# Patient Record
Sex: FEMALE | Race: WHITE | NOT HISPANIC OR LATINO
[De-identification: names, ages, dates, MRNs, and addresses within clinical notes are randomized per-mention and may not be internally consistent; named-entity substitution may affect disease eponyms.]

---

## 2017-02-26 ENCOUNTER — FORM ENCOUNTER (OUTPATIENT)
Age: 62
End: 2017-02-26

## 2017-02-27 ENCOUNTER — APPOINTMENT (OUTPATIENT)
Dept: RHEUMATOLOGY | Facility: CLINIC | Age: 62
End: 2017-02-27

## 2017-02-27 VITALS
DIASTOLIC BLOOD PRESSURE: 83 MMHG | OXYGEN SATURATION: 98 % | HEART RATE: 86 BPM | SYSTOLIC BLOOD PRESSURE: 129 MMHG | HEIGHT: 64 IN | BODY MASS INDEX: 40.8 KG/M2 | WEIGHT: 239 LBS

## 2017-02-27 DIAGNOSIS — D68.9 COAGULATION DEFECT, UNSPECIFIED: ICD-10-CM

## 2017-02-27 DIAGNOSIS — E66.01 MORBID (SEVERE) OBESITY DUE TO EXCESS CALORIES: ICD-10-CM

## 2017-02-27 DIAGNOSIS — B02.29 OTHER POSTHERPETIC NERVOUS SYSTEM INVOLVEMENT: ICD-10-CM

## 2017-02-27 DIAGNOSIS — M15.9 POLYOSTEOARTHRITIS, UNSPECIFIED: ICD-10-CM

## 2017-02-27 DIAGNOSIS — I50.22 CHRONIC SYSTOLIC (CONGESTIVE) HEART FAILURE: ICD-10-CM

## 2017-02-27 RX ORDER — POTASSIUM CHLORIDE 1500 MG/1
20 TABLET, FILM COATED, EXTENDED RELEASE ORAL
Qty: 9 | Refills: 0 | Status: ACTIVE | COMMUNITY
Start: 2017-02-27

## 2017-02-27 RX ADMIN — METHYLPREDNISOLONE ACETATE 0 MG/ML: 40 INJECTION, SUSPENSION INTRA-ARTICULAR; INTRALESIONAL; INTRAMUSCULAR; SOFT TISSUE at 00:00

## 2017-03-03 LAB
25(OH)D3 SERPL-MCNC: 26.4 NG/ML
ALBUMIN SERPL ELPH-MCNC: 4.1 G/DL
ALP BLD-CCNC: 114 U/L
ALT SERPL-CCNC: 19 U/L
ANA SER IF-ACNC: NEGATIVE
ANION GAP SERPL CALC-SCNC: 14 MMOL/L
APPEARANCE: CLEAR
AST SERPL-CCNC: 19 U/L
B2 GLYCOPROT1 IGA SERPL IA-ACNC: <5 SAU
BASOPHILS # BLD AUTO: 0.05 K/UL
BASOPHILS NFR BLD AUTO: 0.7 %
BILIRUB SERPL-MCNC: 0.7 MG/DL
BILIRUBIN URINE: NEGATIVE
BLOOD URINE: NEGATIVE
BUN SERPL-MCNC: 23 MG/DL
C3 SERPL-MCNC: 139 MG/DL
C4 SERPL-MCNC: 20 MG/DL
CALCIUM SERPL-MCNC: 10 MG/DL
CARDIOLIPIN AB SER IA-ACNC: NEGATIVE
CHLORIDE SERPL-SCNC: 102 MMOL/L
CK SERPL-CCNC: 79 U/L
CO2 SERPL-SCNC: 25 MMOL/L
COLOR: YELLOW
CONFIRM: 38 SEC
CREAT SERPL-MCNC: 1.14 MG/DL
CRP SERPL-MCNC: 0.37 MG/DL
DRVVT IMM 1:2 NP PPP: NORMAL
DRVVT SCREEN TO CONFIRM RATIO: 0.92 RATIO
DSDNA AB SER-ACNC: <12 IU/ML
ENA RNP AB SER IA-ACNC: <0.2 AL
ENA SM AB SER IA-ACNC: <0.2 AL
ENA SS-A AB SER IA-ACNC: <0.2 AL
ENA SS-B AB SER IA-ACNC: <0.2 AL
EOSINOPHIL # BLD AUTO: 0.18 K/UL
EOSINOPHIL NFR BLD AUTO: 2.4 %
ERYTHROCYTE [SEDIMENTATION RATE] IN BLOOD BY WESTERGREN METHOD: 9 MM/HR
GLUCOSE QUALITATIVE U: NORMAL MG/DL
GLUCOSE SERPL-MCNC: 106 MG/DL
HCT VFR BLD CALC: 45.1 %
HGB BLD-MCNC: 15 G/DL
IMM GRANULOCYTES NFR BLD AUTO: 0.1 %
KETONES URINE: NEGATIVE
LEUKOCYTE ESTERASE URINE: NEGATIVE
LYMPHOCYTES # BLD AUTO: 1.93 K/UL
LYMPHOCYTES NFR BLD AUTO: 25.4 %
MAN DIFF?: NORMAL
MCHC RBC-ENTMCNC: 28.2 PG
MCHC RBC-ENTMCNC: 33.3 GM/DL
MCV RBC AUTO: 84.8 FL
MONOCYTES # BLD AUTO: 0.49 K/UL
MONOCYTES NFR BLD AUTO: 6.4 %
NEUTROPHILS # BLD AUTO: 4.95 K/UL
NEUTROPHILS NFR BLD AUTO: 65 %
NITRITE URINE: NEGATIVE
PH URINE: 6
PLATELET # BLD AUTO: 281 K/UL
POTASSIUM SERPL-SCNC: 5.2 MMOL/L
PROT SERPL-MCNC: 6.7 G/DL
PROTEIN URINE: NEGATIVE MG/DL
RBC # BLD: 5.32 M/UL
RBC # FLD: 15.1 %
RHEUMATOID FACT SER QL: 8.3 IU/ML
SCREEN DRVVT: 39.2 SEC
SODIUM SERPL-SCNC: 141 MMOL/L
SPECIFIC GRAVITY URINE: 1.01
THYROGLOB AB SERPL-ACNC: <20 IU/ML
THYROPEROXIDASE AB SERPL IA-ACNC: <10 IU/ML
TSH SERPL-ACNC: 1.03 UIU/ML
UROBILINOGEN URINE: NORMAL MG/DL
WBC # FLD AUTO: 7.61 K/UL

## 2017-03-11 PROBLEM — B02.29 POST HERPETIC NEURALGIA: Status: ACTIVE | Noted: 2017-03-11

## 2017-03-15 RX ORDER — LIDOCAINE 5% 700 MG/1
5 PATCH TOPICAL
Qty: 90 | Refills: 1 | Status: ACTIVE | COMMUNITY
Start: 2017-02-27

## 2017-04-16 ENCOUNTER — RX RENEWAL (OUTPATIENT)
Age: 62
End: 2017-04-16

## 2017-06-04 ENCOUNTER — RX RENEWAL (OUTPATIENT)
Age: 62
End: 2017-06-04

## 2017-07-14 ENCOUNTER — RX RENEWAL (OUTPATIENT)
Age: 62
End: 2017-07-14

## 2017-09-10 ENCOUNTER — RX RENEWAL (OUTPATIENT)
Age: 62
End: 2017-09-10

## 2017-10-01 ENCOUNTER — RX RENEWAL (OUTPATIENT)
Age: 62
End: 2017-10-01

## 2017-10-20 ENCOUNTER — RX RENEWAL (OUTPATIENT)
Age: 62
End: 2017-10-20

## 2017-10-20 RX ORDER — FUROSEMIDE 40 MG/1
40 TABLET ORAL DAILY
Qty: 30 | Refills: 0 | Status: ACTIVE | COMMUNITY
Start: 2017-02-27 | End: 1900-01-01

## 2018-01-15 ENCOUNTER — RX RENEWAL (OUTPATIENT)
Age: 63
End: 2018-01-15

## 2018-04-24 ENCOUNTER — RX RENEWAL (OUTPATIENT)
Age: 63
End: 2018-04-24

## 2018-07-31 ENCOUNTER — RX RENEWAL (OUTPATIENT)
Age: 63
End: 2018-07-31

## 2018-10-28 ENCOUNTER — RX RENEWAL (OUTPATIENT)
Age: 63
End: 2018-10-28

## 2018-10-29 ENCOUNTER — RX RENEWAL (OUTPATIENT)
Age: 63
End: 2018-10-29

## 2018-12-26 ENCOUNTER — RX RENEWAL (OUTPATIENT)
Age: 63
End: 2018-12-26

## 2018-12-26 RX ORDER — CELECOXIB 100 MG/1
100 CAPSULE ORAL
Qty: 60 | Refills: 2 | Status: ACTIVE | COMMUNITY
Start: 2017-02-27 | End: 1900-01-01

## 2019-03-31 ENCOUNTER — RX RENEWAL (OUTPATIENT)
Age: 64
End: 2019-03-31

## 2022-04-28 ENCOUNTER — RESULT REVIEW (OUTPATIENT)
Age: 67
End: 2022-04-28

## 2022-12-22 ENCOUNTER — OFFICE (OUTPATIENT)
Dept: URBAN - METROPOLITAN AREA CLINIC 105 | Facility: CLINIC | Age: 67
Setting detail: OPHTHALMOLOGY
End: 2022-12-22
Payer: MEDICARE

## 2022-12-22 DIAGNOSIS — H16.221: ICD-10-CM

## 2022-12-22 DIAGNOSIS — H40.1232: ICD-10-CM

## 2022-12-22 DIAGNOSIS — H11.823: ICD-10-CM

## 2022-12-22 DIAGNOSIS — Z96.1: ICD-10-CM

## 2022-12-22 PROCEDURE — 99213 OFFICE O/P EST LOW 20 MIN: CPT | Performed by: OPHTHALMOLOGY

## 2022-12-22 PROCEDURE — 92083 EXTENDED VISUAL FIELD XM: CPT | Performed by: OPHTHALMOLOGY

## 2022-12-22 ASSESSMENT — REFRACTION_CURRENTRX
OD_SPHERE: +2.25
OD_AXIS: 087
OD_CYLINDER: -0.50
OS_AXIS: 078
OS_OVR_VA: 20/
OS_ADD: +2.75
OD_OVR_VA: 20/
OS_CYLINDER: -0.50
OS_VPRISM_DIRECTION: PROGS
OD_ADD: +2.75
OD_VPRISM_DIRECTION: PROGS
OS_SPHERE: +1.75

## 2022-12-22 ASSESSMENT — REFRACTION_MANIFEST
OD_VA1: 20/25+
OD_SPHERE: PLANO
OS_VA1: 20/25
OS_AXIS: 075
OD_SPHERE: +0.25
OD_ADD: +2.50
OS_SPHERE: +0.50
OS_VA1: 20/25
OD_ADD: +2.50
OD_AXIS: 105
OD_VA1: 20/25+
OS_AXIS: 075
OS_CYLINDER: -1.25
OS_SPHERE: +0.75
OD_AXIS: 105
OS_CYLINDER: -1.25
OS_ADD: +2.50
OS_ADD: +2.50
OD_CYLINDER: -0.75
OD_CYLINDER: -0.75

## 2022-12-22 ASSESSMENT — AXIALLENGTH_DERIVED
OS_AL: 23.4577
OS_AL: 23.3618
OS_AL: 23.3618
OD_AL: 23.5544
OD_AL: 23.4577

## 2022-12-22 ASSESSMENT — SPHEQUIV_DERIVED
OS_SPHEQUIV: 0.125
OD_SPHEQUIV: -0.375
OS_SPHEQUIV: 0.125
OD_SPHEQUIV: -0.125
OS_SPHEQUIV: -0.125

## 2022-12-22 ASSESSMENT — KERATOMETRY
OS_K1POWER_DIOPTERS: 43.75
OS_K2POWER_DIOPTERS: 44.25
OD_K1POWER_DIOPTERS: 44.00
OS_AXISANGLE_DEGREES: 152
OD_K2POWER_DIOPTERS: 44.00
OD_AXISANGLE_DEGREES: 090

## 2022-12-22 ASSESSMENT — CONFRONTATIONAL VISUAL FIELD TEST (CVF)
OS_FINDINGS: FULL
OD_FINDINGS: FULL

## 2022-12-22 ASSESSMENT — REFRACTION_AUTOREFRACTION
OD_AXIS: 092
OS_AXIS: 072
OD_CYLINDER: -0.75
OS_CYLINDER: -1.25
OS_SPHERE: +0.75
OD_SPHERE: 0.00

## 2022-12-22 ASSESSMENT — TONOMETRY
OS_IOP_MMHG: 16
OD_IOP_MMHG: 18

## 2022-12-22 ASSESSMENT — VISUAL ACUITY
OD_BCVA: 20/30-1
OS_BCVA: 20/25

## 2022-12-22 ASSESSMENT — PACHYMETRY
OS_CT_UM: 472
OS_CT_CORRECTION: 5
OD_CT_CORRECTION: 4
OD_CT_UM: 495

## 2023-01-30 ENCOUNTER — APPOINTMENT (OUTPATIENT)
Dept: NEUROLOGY | Facility: CLINIC | Age: 68
End: 2023-01-30
Payer: MEDICARE

## 2023-01-30 VITALS — WEIGHT: 186 LBS | BODY MASS INDEX: 30.99 KG/M2 | HEIGHT: 65 IN

## 2023-01-30 VITALS — SYSTOLIC BLOOD PRESSURE: 160 MMHG | DIASTOLIC BLOOD PRESSURE: 80 MMHG | HEART RATE: 72 BPM

## 2023-01-30 DIAGNOSIS — K90.0 CELIAC DISEASE: ICD-10-CM

## 2023-01-30 DIAGNOSIS — G31.84 MILD COGNITIVE IMPAIRMENT, SO STATED: ICD-10-CM

## 2023-01-30 DIAGNOSIS — R27.0 ATAXIA, UNSPECIFIED: ICD-10-CM

## 2023-01-30 PROCEDURE — 99204 OFFICE O/P NEW MOD 45 MIN: CPT

## 2023-01-30 NOTE — ASSESSMENT
[FreeTextEntry1] : Mrs. Stevens is a 67-year-old with longstanding and not particularly progressive cognitive complaints.  In addition, she has balance and gait difficulties.  Past evaluation was notable for mild white matter disease and EEG revealed shifting temporal slowing.  The cause of her cognitive and motor complaints is unclear.  I suggested a comprehensive reassessment of her complaints.  This will include an MRI of the brain with and without contrast, MR angiography, 24-hour EEG, comprehensive serologies, EMG and nerve conduction studies and a neuropsychological evaluation.  Further management will depend upon these results and her clinical course.

## 2023-01-30 NOTE — HISTORY OF PRESENT ILLNESS
[FreeTextEntry1] : Mrs. Rosita Stevens is a 67-year-old right-handed patient who was previously evaluated in 2016.  She was accompanied by her .\par \par Mrs. Stevens has a complicated medical history including remote DVT and pulmonary emboli for which she has been maintained on warfarin since the 1980s.  In 1998 she suffered a right hemispheric stroke manifested by a left hemiparesis which resolved.  She is status post mitral valve repair and ASD closure.  She has a history of biopsy-proven celiac disease.  She presented with recent onset of concentration and memory difficulties.\par \par MRI of the brain revealed mild white matter changes without evidence of infarction.  Routine EEG revealed mild shifting temporal slowing, left greater than right.  She was lost to follow-up.\par \par She reports recent onset of dropping objects from her right hand.  Her balance is poor and she falls over.  She complains of memory difficulties and repetitiveness.  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  She is entirely functional.  She denies hallucinations.  She complains of postural lightheadedness.\par \par She is under the care of Dr. Lamin Echevarria, a psychiatrist.  She is currently on Wellbutrin, digoxin, Effexor, melatonin, metoprolol and Jantoven.\par \par Past surgical history is notable for tonsillectomy, cataract extractions, appendectomy, hysterectomy, mitral valve repair, ASD closure and bilateral knee replacements.  She suffers from hypercoagulable state, arrhythmia, celiac disease and arthritis.  There is no history of hypertension, diabetes, hyperlipidemia, pulmonary, renal, hepatic, thyroid or hematologic disease.  She has an allergy to penicillin.  She is a former smoker and nondrinker.  She is  and works as a registered nurse.  Family history is notable for mother with onset of Alzheimer's disease in her 70s.  Her father suffered from coronary disease.

## 2023-01-30 NOTE — CONSULT LETTER
[Dear  ___] : Dear  [unfilled], [Consult Letter:] : I had the pleasure of evaluating your patient, [unfilled]. [Please see my note below.] : Please see my note below. [Consult Closing:] : Thank you very much for allowing me to participate in the care of this patient.  If you have any questions, please do not hesitate to contact me. [Sincerely,] : Sincerely, [DrThor  ___] : Dr. HERNANDEZ [FreeTextEntry3] : Sanford Marcial MD\par

## 2023-01-30 NOTE — PHYSICAL EXAM
[FreeTextEntry1] : Constitutional:  Patient was well-developed, well-nourished and in no acute distress. \par \par Head:  Normocephalic, atraumatic. Tympanic membranes were clear. \par \par Neck:  Supple with full range of motion. \par \par Cardiovascular:  Cardiac rhythm was regular without murmur. There were no carotid bruits. Peripheral pulses were full and symmetric.  Blood pressure was 160/80 in the right arm, seated and erect.\par \par Respiratory:  Lungs were clear. \par \par Abdomen:  Soft and nontender. \par \par Spine:  Nontender. \par \par Skin:  There were no rashes. \par \par NEUROLOGICAL EXAMINATION:\par \par Mental Status: Patient was alert and oriented. Speech was fluent. There was no dysarthria.  She scored 28 out of 30 on MMSE making 2 errors in recall.  Handwriting was not micrographic.\par \par Cranial Nerves: \par \par II: Visual acuity was 20/20-2 in the right eye and 20/25-1 in the left eye with glasses and near card. Pupils were surgical but reactive. Visual fields were full. Funduscopic examination was normal. \par \par III, IV, VI:  Eye movements were full without nystagmus. \par \par V: Facial sensation was intact. \par \par VII: Facial strength was normal. \par \par VIII: Hearing was equal. \par \par IX, X: Palatal movement was normal. Phonation was normal. \par \par XI: Sternocleidomastoids and trapezii were normal. \par \par XII: Tongue was midline and movements normal. There was no lingual atrophy or fasciculations. \par \par Motor Examination: Muscle bulk, tone and strength were normal.  There was no significant tremor.  Fine finger movements were normal.\par \par Sensory Examination: Pinprick, vibration and joint position sense were intact. \par \par Reflexes: DTRs were 2 throughout. \par \par Plantar Responses: Plantar responses were flexor. \par \par Coordination/Cerebellar Function: There was no dysmetria on finger to nose or heel to shin testing. \par \par Gait/Stance: Posture was normal.  Strides were mildly short.  Turns were mildly decomposed.  She exhibited mild choreiform movements in her hands while ambulating.  She swayed on Romberg testing.  Tandem was unsteady.

## 2023-03-02 ENCOUNTER — APPOINTMENT (OUTPATIENT)
Dept: NEUROLOGY | Facility: CLINIC | Age: 68
End: 2023-03-02
Payer: MEDICARE

## 2023-03-02 PROCEDURE — 95816 EEG AWAKE AND DROWSY: CPT

## 2023-03-03 PROCEDURE — 95719 EEG PHYS/QHP EA INCR W/O VID: CPT

## 2023-03-03 PROCEDURE — 95700 EEG CONT REC W/VID EEG TECH: CPT

## 2023-03-03 PROCEDURE — 95708 EEG WO VID EA 12-26HR UNMNTR: CPT

## 2023-03-13 ENCOUNTER — NON-APPOINTMENT (OUTPATIENT)
Age: 68
End: 2023-03-13

## 2023-03-13 ENCOUNTER — TRANSCRIPTION ENCOUNTER (OUTPATIENT)
Age: 68
End: 2023-03-13

## 2023-04-10 ENCOUNTER — LABORATORY RESULT (OUTPATIENT)
Age: 68
End: 2023-04-10

## 2023-04-11 LAB
25(OH)D3 SERPL-MCNC: 47.6 NG/ML
ALBUMIN SERPL ELPH-MCNC: 4.1 G/DL
ALP BLD-CCNC: 84 U/L
ALT SERPL-CCNC: 16 U/L
ANION GAP SERPL CALC-SCNC: 12 MMOL/L
AST SERPL-CCNC: 17 U/L
BASOPHILS # BLD AUTO: 0.08 K/UL
BASOPHILS NFR BLD AUTO: 0.8 %
BILIRUB SERPL-MCNC: 0.6 MG/DL
BUN SERPL-MCNC: 18 MG/DL
CALCIUM SERPL-MCNC: 10.2 MG/DL
CERULOPLASMIN SERPL-MCNC: 27 MG/DL
CHLORIDE SERPL-SCNC: 103 MMOL/L
CHOLEST SERPL-MCNC: 189 MG/DL
CO2 SERPL-SCNC: 25 MMOL/L
CREAT SERPL-MCNC: 1.02 MG/DL
CRP SERPL-MCNC: 6 MG/L
EGFR: 60 ML/MIN/1.73M2
EOSINOPHIL # BLD AUTO: 0.34 K/UL
EOSINOPHIL NFR BLD AUTO: 3.3 %
ERYTHROCYTE [SEDIMENTATION RATE] IN BLOOD BY WESTERGREN METHOD: 2 MM/HR
FOLATE SERPL-MCNC: 19.6 NG/ML
GLUCOSE SERPL-MCNC: 125 MG/DL
HCT VFR BLD CALC: 47 %
HCYS SERPL-MCNC: 13.9 UMOL/L
HDLC SERPL-MCNC: 42 MG/DL
HGB BLD-MCNC: 14.6 G/DL
IMM GRANULOCYTES NFR BLD AUTO: 0.4 %
LDLC SERPL CALC-MCNC: 118 MG/DL
LYMPHOCYTES # BLD AUTO: 2.39 K/UL
LYMPHOCYTES NFR BLD AUTO: 23.5 %
MAN DIFF?: NORMAL
MCHC RBC-ENTMCNC: 27.9 PG
MCHC RBC-ENTMCNC: 31.1 GM/DL
MCV RBC AUTO: 89.7 FL
MONOCYTES # BLD AUTO: 0.56 K/UL
MONOCYTES NFR BLD AUTO: 5.5 %
NEUTROPHILS # BLD AUTO: 6.77 K/UL
NEUTROPHILS NFR BLD AUTO: 66.5 %
NONHDLC SERPL-MCNC: 148 MG/DL
PLATELET # BLD AUTO: 290 K/UL
POTASSIUM SERPL-SCNC: 5 MMOL/L
PROT SERPL-MCNC: 6.5 G/DL
RBC # BLD: 5.24 M/UL
RBC # FLD: 14.9 %
SODIUM SERPL-SCNC: 140 MMOL/L
T PALLIDUM AB SER QL IA: NEGATIVE
T3FREE SERPL-MCNC: 3.46 PG/ML
T4 FREE SERPL-MCNC: 1.4 NG/DL
TRIGL SERPL-MCNC: 150 MG/DL
TSH SERPL-ACNC: 0.94 UIU/ML
VIT B12 SERPL-MCNC: 795 PG/ML
WBC # FLD AUTO: 10.18 K/UL

## 2023-04-12 LAB
ALBUMIN MFR SERPL ELPH: 61.7 %
ALBUMIN SERPL-MCNC: 4 G/DL
ALBUMIN/GLOB SERPL: 1.6 RATIO
ALPHA1 GLOB MFR SERPL ELPH: 4.7 %
ALPHA1 GLOB SERPL ELPH-MCNC: 0.3 G/DL
ALPHA2 GLOB MFR SERPL ELPH: 9.5 %
ALPHA2 GLOB SERPL ELPH-MCNC: 0.6 G/DL
B-GLOBULIN MFR SERPL ELPH: 11.8 %
B-GLOBULIN SERPL ELPH-MCNC: 0.8 G/DL
DEPRECATED KAPPA LC FREE/LAMBDA SER: 1.21 RATIO
GAMMA GLOB FLD ELPH-MCNC: 0.8 G/DL
GAMMA GLOB MFR SERPL ELPH: 12.3 %
IGA SER QL IEP: 141 MG/DL
IGG SER QL IEP: 884 MG/DL
IGM SER QL IEP: 45 MG/DL
INTERPRETATION SERPL IEP-IMP: NORMAL
KAPPA LC CSF-MCNC: 2.76 MG/DL
KAPPA LC SERPL-MCNC: 3.34 MG/DL
M PROTEIN SPEC IFE-MCNC: NORMAL
PROT SERPL-MCNC: 6.5 G/DL
PROT SERPL-MCNC: 6.5 G/DL

## 2023-04-13 LAB
ANACR T: NEGATIVE
CELIACPAN: NORMAL

## 2023-04-14 LAB
COPPER 24H UR-MCNC: NORMAL
COPPER UR-MCNC: 11 UG/L
COPPER/CREATININE RATIO: 14 UG/G CREAT
CREATININE, URINE: 0.8 G/L
FMR1 GENE MUT ANL BLD/T: NORMAL
METHYLMALONATE SERPL-SCNC: 364 NMOL/L
VIT B1 SERPL-MCNC: 141.8 NMOL/L

## 2023-04-18 LAB — IGA 24H UR QL IFE: NORMAL

## 2023-04-20 LAB
AMPA-R ABCBA: NEGATIVE
AMPHIPHYSIN IGG TITR SER IF: NEGATIVE
CASPR2-IGG CBA, S: NEGATIVE
CV2 IGG TITR SER: NEGATIVE
GABA-B ABCBA: NEGATIVE
GAD65 AB SER-MCNC: 0 NMOL/L
GENETICIST REVIEW: NORMAL
GLIAL NUC TYPE 1 AB TITR SER: NEGATIVE
HTT GENE CAG RPT ENTNUM BLD/T: NORMAL
HU1 AB TITR SER: NEGATIVE
HU2 AB TITR SER IF: NEGATIVE
HU3 AB TITR SER: NEGATIVE
HUNTINGTON DISEASE ANALYSIS REFERRAL REASON: NORMAL
IGLON5 IFA, S: NEGATIVE
IMMUNOLOGIST REVIEW: NORMAL
LGI1-IGG CBA, S: NEGATIVE
MOL DX INTERP BLD/T QL: NEGATIVE
NIF IFA, S: NEGATIVE
PCA-1 AB TITR SER: NEGATIVE
PCA-2 AB TITR SER: NEGATIVE
PCA-TR AB TITR SER: NEGATIVE
PROVIDER SIGNING NAME: NORMAL
SPECIMEN SOURCE: NORMAL

## 2023-07-26 ENCOUNTER — APPOINTMENT (OUTPATIENT)
Dept: NEUROLOGY | Facility: CLINIC | Age: 68
End: 2023-07-26

## 2023-11-13 ENCOUNTER — APPOINTMENT (OUTPATIENT)
Dept: NEUROLOGY | Facility: CLINIC | Age: 68
End: 2023-11-13

## 2023-11-20 ENCOUNTER — APPOINTMENT (OUTPATIENT)
Dept: NEUROLOGY | Facility: CLINIC | Age: 68
End: 2023-11-20

## 2023-11-21 ENCOUNTER — OFFICE (OUTPATIENT)
Dept: URBAN - METROPOLITAN AREA CLINIC 105 | Facility: CLINIC | Age: 68
Setting detail: OPHTHALMOLOGY
End: 2023-11-21
Payer: MEDICARE

## 2023-11-21 DIAGNOSIS — H16.221: ICD-10-CM

## 2023-11-21 DIAGNOSIS — H11.823: ICD-10-CM

## 2023-11-21 DIAGNOSIS — H40.1232: ICD-10-CM

## 2023-11-21 DIAGNOSIS — H26.493: ICD-10-CM

## 2023-11-21 PROCEDURE — 99214 OFFICE O/P EST MOD 30 MIN: CPT | Performed by: STUDENT IN AN ORGANIZED HEALTH CARE EDUCATION/TRAINING PROGRAM

## 2023-11-21 PROCEDURE — 92250 FUNDUS PHOTOGRAPHY W/I&R: CPT | Performed by: STUDENT IN AN ORGANIZED HEALTH CARE EDUCATION/TRAINING PROGRAM

## 2023-11-21 ASSESSMENT — REFRACTION_AUTOREFRACTION
OS_CYLINDER: -1.75
OS_SPHERE: +1.00
OD_AXIS: 102
OS_AXIS: 071
OD_SPHERE: 0.00
OD_CYLINDER: -0.75

## 2023-11-21 ASSESSMENT — REFRACTION_CURRENTRX
OD_ADD: +2.00
OS_CYLINDER: -1.00
OS_VPRISM_DIRECTION: PROGS
OS_ADD: +2.00
OD_AXIS: 085
OD_CYLINDER: -1.00
OS_OVR_VA: 20/
OS_AXIS: 077
OD_OVR_VA: 20/
OD_VPRISM_DIRECTION: PROGS
OD_SPHERE: +0.50
OS_SPHERE: +0.75

## 2023-11-21 ASSESSMENT — REFRACTION_MANIFEST
OS_ADD: +2.50
OS_SPHERE: +0.75
OD_SPHERE: +0.25
OD_CYLINDER: -0.75
OD_VA1: 20/25+
OD_VA1: 20/25+
OD_ADD: +2.50
OS_VA1: 20/25
OD_SPHERE: PLANO
OS_CYLINDER: -1.25
OD_AXIS: 105
OS_VA1: 20/25
OD_AXIS: 105
OS_AXIS: 075
OD_CYLINDER: -0.75
OS_AXIS: 075
OS_CYLINDER: -1.25
OS_SPHERE: +0.50
OD_ADD: +2.50
OS_ADD: +2.50

## 2023-11-21 ASSESSMENT — SPHEQUIV_DERIVED
OS_SPHEQUIV: -0.125
OD_SPHEQUIV: -0.375
OS_SPHEQUIV: 0.125
OS_SPHEQUIV: 0.125
OD_SPHEQUIV: -0.125

## 2023-11-21 ASSESSMENT — CONFRONTATIONAL VISUAL FIELD TEST (CVF)
OS_FINDINGS: FULL
OD_FINDINGS: FULL

## 2023-12-19 ENCOUNTER — RX ONLY (RX ONLY)
Age: 68
End: 2023-12-19

## 2023-12-19 ENCOUNTER — OFFICE (OUTPATIENT)
Dept: URBAN - METROPOLITAN AREA CLINIC 105 | Facility: CLINIC | Age: 68
Setting detail: OPHTHALMOLOGY
End: 2023-12-19
Payer: MEDICARE

## 2023-12-19 DIAGNOSIS — H26.492: ICD-10-CM

## 2023-12-19 PROCEDURE — 66821 AFTER CATARACT LASER SURGERY: CPT | Mod: LT | Performed by: STUDENT IN AN ORGANIZED HEALTH CARE EDUCATION/TRAINING PROGRAM

## 2023-12-19 ASSESSMENT — REFRACTION_CURRENTRX
OS_CYLINDER: -1.00
OD_VPRISM_DIRECTION: PROGS
OD_SPHERE: +0.50
OS_ADD: +2.00
OD_CYLINDER: -1.00
OS_OVR_VA: 20/
OD_OVR_VA: 20/
OS_SPHERE: +0.75
OS_VPRISM_DIRECTION: PROGS
OD_AXIS: 085
OS_AXIS: 077
OD_ADD: +2.00

## 2023-12-19 ASSESSMENT — REFRACTION_AUTOREFRACTION
OD_SPHERE: 0.00
OD_CYLINDER: -0.75
OS_AXIS: 071
OD_AXIS: 102
OS_SPHERE: +1.00
OS_CYLINDER: -1.75

## 2023-12-19 ASSESSMENT — SPHEQUIV_DERIVED
OD_SPHEQUIV: -0.375
OS_SPHEQUIV: 0.125
OS_SPHEQUIV: 0.125
OS_SPHEQUIV: -0.125
OD_SPHEQUIV: -0.125

## 2023-12-19 ASSESSMENT — REFRACTION_MANIFEST
OD_AXIS: 105
OD_CYLINDER: -0.75
OD_ADD: +2.50
OS_SPHERE: +0.75
OS_ADD: +2.50
OD_SPHERE: PLANO
OS_AXIS: 075
OS_ADD: +2.50
OS_AXIS: 075
OD_AXIS: 105
OS_VA1: 20/25
OS_VA1: 20/25
OD_SPHERE: +0.25
OS_SPHERE: +0.50
OD_ADD: +2.50
OS_CYLINDER: -1.25
OD_VA1: 20/25+
OD_VA1: 20/25+
OS_CYLINDER: -1.25
OD_CYLINDER: -0.75

## 2024-09-25 ENCOUNTER — OFFICE (OUTPATIENT)
Dept: URBAN - METROPOLITAN AREA CLINIC 105 | Facility: CLINIC | Age: 69
Setting detail: OPHTHALMOLOGY
End: 2024-09-25
Payer: MEDICARE

## 2024-09-25 DIAGNOSIS — H26.491: ICD-10-CM

## 2024-09-25 PROCEDURE — 66821 AFTER CATARACT LASER SURGERY: CPT | Mod: RT | Performed by: STUDENT IN AN ORGANIZED HEALTH CARE EDUCATION/TRAINING PROGRAM

## 2024-09-25 ASSESSMENT — CONFRONTATIONAL VISUAL FIELD TEST (CVF)
OS_FINDINGS: FULL
OD_FINDINGS: FULL

## 2024-10-08 ENCOUNTER — OFFICE (OUTPATIENT)
Dept: URBAN - METROPOLITAN AREA CLINIC 105 | Facility: CLINIC | Age: 69
Setting detail: OPHTHALMOLOGY
End: 2024-10-08
Payer: MEDICARE

## 2024-10-08 ENCOUNTER — RX ONLY (RX ONLY)
Age: 69
End: 2024-10-08

## 2024-10-08 DIAGNOSIS — H26.493: ICD-10-CM

## 2024-10-08 DIAGNOSIS — H16.223: ICD-10-CM

## 2024-10-08 DIAGNOSIS — H40.1232: ICD-10-CM

## 2024-10-08 PROCEDURE — 99024 POSTOP FOLLOW-UP VISIT: CPT | Performed by: STUDENT IN AN ORGANIZED HEALTH CARE EDUCATION/TRAINING PROGRAM

## 2024-10-08 ASSESSMENT — REFRACTION_MANIFEST
OS_CYLINDER: -1.25
OS_SPHERE: +0.50
OD_AXIS: 105
OS_VA1: 20/25
OD_SPHERE: PLANO
OD_VA1: 20/25+
OD_ADD: +2.50
OS_ADD: +2.50
OD_ADD: +2.50
OD_AXIS: 105
OS_SPHERE: +0.75
OS_CYLINDER: -1.25
OD_CYLINDER: -0.75
OS_ADD: +2.50
OD_CYLINDER: -0.75
OD_VA1: 20/25+
OD_SPHERE: +0.25
OS_VA1: 20/25
OS_AXIS: 075
OS_AXIS: 075

## 2024-10-08 ASSESSMENT — REFRACTION_CURRENTRX
OD_SPHERE: PLANO
OD_CYLINDER: -0.75
OS_OVR_VA: 20/
OD_ADD: +2.00
OS_VPRISM_DIRECTION: PROGS
OD_AXIS: 100
OD_OVR_VA: 20/
OD_VPRISM_DIRECTION: PROGS
OS_CYLINDER: -1.25
OS_SPHERE: +0.50
OS_AXIS: 078
OS_ADD: +2.00

## 2024-10-08 ASSESSMENT — REFRACTION_AUTOREFRACTION
OS_AXIS: 073
OD_CYLINDER: -0.75
OD_SPHERE: -0.25
OD_AXIS: 103
OS_SPHERE: +1.25
OS_CYLINDER: -1.75

## 2024-10-08 ASSESSMENT — PACHYMETRY
OS_CT_UM: 472
OS_CT_CORRECTION: 5
OD_CT_CORRECTION: 4
OD_CT_UM: 495

## 2024-10-08 ASSESSMENT — KERATOMETRY
OD_K1POWER_DIOPTERS: 43.75
OD_AXISANGLE_DEGREES: 001
OS_AXISANGLE_DEGREES: 160
OS_K2POWER_DIOPTERS: 44.25
OS_K1POWER_DIOPTERS: 43.25
OD_K2POWER_DIOPTERS: 44.00

## 2024-10-08 ASSESSMENT — CONFRONTATIONAL VISUAL FIELD TEST (CVF)
OD_FINDINGS: FULL
OS_FINDINGS: FULL

## 2024-10-08 ASSESSMENT — VISUAL ACUITY
OD_BCVA: 20/70-2
OS_BCVA: 20/80-2

## 2024-10-08 ASSESSMENT — TEAR BREAK UP TIME (TBUT)
OS_TBUT: 3+ 4+
OD_TBUT: 3+ 4+

## 2024-10-08 ASSESSMENT — TONOMETRY
OS_IOP_MMHG: 14
OD_IOP_MMHG: 16

## 2024-11-18 ENCOUNTER — OFFICE (OUTPATIENT)
Dept: URBAN - METROPOLITAN AREA CLINIC 113 | Facility: CLINIC | Age: 69
Setting detail: OPHTHALMOLOGY
End: 2024-11-18
Payer: MEDICARE

## 2024-11-18 DIAGNOSIS — H16.223: ICD-10-CM

## 2024-11-18 DIAGNOSIS — H26.493: ICD-10-CM

## 2024-11-18 DIAGNOSIS — H40.1231: ICD-10-CM

## 2024-11-18 PROCEDURE — 92083 EXTENDED VISUAL FIELD XM: CPT | Performed by: STUDENT IN AN ORGANIZED HEALTH CARE EDUCATION/TRAINING PROGRAM

## 2024-11-18 PROCEDURE — 99213 OFFICE O/P EST LOW 20 MIN: CPT | Mod: 24 | Performed by: STUDENT IN AN ORGANIZED HEALTH CARE EDUCATION/TRAINING PROGRAM

## 2024-11-18 PROCEDURE — 92133 CPTRZD OPH DX IMG PST SGM ON: CPT | Performed by: STUDENT IN AN ORGANIZED HEALTH CARE EDUCATION/TRAINING PROGRAM

## 2024-11-18 ASSESSMENT — REFRACTION_MANIFEST
OS_AXIS: 075
OD_AXIS: 105
OD_VA1: 20/25+
OD_SPHERE: +0.25
OS_VA1: 20/25
OS_CYLINDER: -1.25
OD_CYLINDER: -0.75
OD_CYLINDER: -0.75
OS_ADD: +2.50
OS_AXIS: 075
OS_SPHERE: +0.75
OS_CYLINDER: -1.25
OD_SPHERE: PLANO
OS_ADD: +2.50
OS_VA1: 20/25
OD_VA1: 20/25+
OD_ADD: +2.50
OD_ADD: +2.50
OS_SPHERE: +0.50
OD_AXIS: 105

## 2024-11-18 ASSESSMENT — REFRACTION_AUTOREFRACTION
OD_CYLINDER: -0.75
OD_AXIS: 109
OS_CYLINDER: -1.75
OS_AXIS: 070
OS_SPHERE: +1.00
OD_SPHERE: PLANO

## 2024-11-18 ASSESSMENT — KERATOMETRY
OD_AXISANGLE_DEGREES: 150
OS_AXISANGLE_DEGREES: 151
OS_K2POWER_DIOPTERS: 44.25
OD_K1POWER_DIOPTERS: 43.50
OS_K1POWER_DIOPTERS: 43.50
OD_K2POWER_DIOPTERS: 44.00

## 2024-11-18 ASSESSMENT — REFRACTION_CURRENTRX
OS_AXIS: 078
OD_ADD: +2.00
OS_ADD: +2.00
OD_SPHERE: PLANO
OD_CYLINDER: -0.75
OD_OVR_VA: 20/
OS_CYLINDER: -1.25
OD_VPRISM_DIRECTION: PROGS
OS_OVR_VA: 20/
OS_VPRISM_DIRECTION: PROGS
OS_SPHERE: +0.50
OD_AXIS: 100

## 2024-11-18 ASSESSMENT — TONOMETRY
OD_IOP_MMHG: 13
OS_IOP_MMHG: 13

## 2024-11-18 ASSESSMENT — SUPERFICIAL PUNCTATE KERATITIS (SPK)
OD_SPK: T
OS_SPK: T

## 2024-11-18 ASSESSMENT — VISUAL ACUITY
OS_BCVA: 20/60
OD_BCVA: 20/25-1

## 2024-11-18 ASSESSMENT — PACHYMETRY
OS_CT_CORRECTION: 5
OD_CT_UM: 495
OD_CT_CORRECTION: 4
OS_CT_UM: 472

## 2025-05-13 ENCOUNTER — OFFICE (OUTPATIENT)
Dept: URBAN - METROPOLITAN AREA CLINIC 105 | Facility: CLINIC | Age: 70
Setting detail: OPHTHALMOLOGY
End: 2025-05-13
Payer: MEDICARE

## 2025-05-13 DIAGNOSIS — H40.1231: ICD-10-CM

## 2025-05-13 DIAGNOSIS — H10.45: ICD-10-CM

## 2025-05-13 DIAGNOSIS — H16.223: ICD-10-CM

## 2025-05-13 PROCEDURE — 92250 FUNDUS PHOTOGRAPHY W/I&R: CPT | Performed by: STUDENT IN AN ORGANIZED HEALTH CARE EDUCATION/TRAINING PROGRAM

## 2025-05-13 PROCEDURE — 99213 OFFICE O/P EST LOW 20 MIN: CPT | Performed by: STUDENT IN AN ORGANIZED HEALTH CARE EDUCATION/TRAINING PROGRAM

## 2025-05-13 ASSESSMENT — REFRACTION_MANIFEST
OD_ADD: +2.50
OD_VA1: 20/25+
OS_VA1: 20/25
OD_AXIS: 105
OS_CYLINDER: -1.25
OD_ADD: +2.50
OS_CYLINDER: -1.25
OD_SPHERE: PLANO
OD_CYLINDER: -0.75
OS_SPHERE: +0.50
OS_SPHERE: +0.75
OD_SPHERE: +0.25
OD_AXIS: 105
OS_ADD: +2.50
OS_ADD: +2.50
OD_CYLINDER: -0.75
OS_AXIS: 075
OD_VA1: 20/25+
OS_AXIS: 075
OS_VA1: 20/25

## 2025-05-13 ASSESSMENT — REFRACTION_CURRENTRX
OS_AXIS: 078
OD_SPHERE: PLANO
OD_VPRISM_DIRECTION: PROGS
OS_OVR_VA: 20/
OS_ADD: +2.50
OS_CYLINDER: -1.25
OS_OVR_VA: 20/
OD_ADD: +2.50
OS_AXIS: 078
OD_OVR_VA: 20/
OD_ADD: +2.00
OS_ADD: +2.00
OD_CYLINDER: -0.75
OD_SPHERE: PLANO
OD_OVR_VA: 20/
OS_CYLINDER: -1.25
OD_VPRISM_DIRECTION: PROGS
OD_AXIS: 110
OS_SPHERE: +0.50
OS_SPHERE: +0.50
OS_VPRISM_DIRECTION: PROGS
OS_VPRISM_DIRECTION: PROGS
OD_AXIS: 100
OD_CYLINDER: -0.75

## 2025-05-13 ASSESSMENT — KERATOMETRY
OS_K1POWER_DIOPTERS: UTP
OD_AXISANGLE_DEGREES: 144
OD_K1POWER_DIOPTERS: 43.75
OD_K2POWER_DIOPTERS: 44.75

## 2025-05-13 ASSESSMENT — TONOMETRY
OD_IOP_MMHG: 18
OD_IOP_MMHG: 18
OS_IOP_MMHG: 18
OS_IOP_MMHG: 16

## 2025-05-13 ASSESSMENT — REFRACTION_AUTOREFRACTION
OS_SPHERE: +0.75
OS_CYLINDER: -1.50
OS_AXIS: 064
OD_AXIS: 097
OD_SPHERE: 0.00
OD_CYLINDER: -0.75

## 2025-05-13 ASSESSMENT — PACHYMETRY
OD_CT_UM: 495
OS_CT_UM: 472
OD_CT_CORRECTION: 4
OS_CT_CORRECTION: 5

## 2025-05-13 ASSESSMENT — SUPERFICIAL PUNCTATE KERATITIS (SPK)
OS_SPK: 2+
OD_SPK: 3+

## 2025-05-13 ASSESSMENT — VISUAL ACUITY
OS_BCVA: 20/50-2
OD_BCVA: 20/40

## 2025-05-13 ASSESSMENT — CONFRONTATIONAL VISUAL FIELD TEST (CVF)
OD_FINDINGS: FULL
OS_FINDINGS: FULL

## 2025-07-02 ENCOUNTER — OFFICE (OUTPATIENT)
Dept: URBAN - METROPOLITAN AREA CLINIC 105 | Facility: CLINIC | Age: 70
Setting detail: OPHTHALMOLOGY
End: 2025-07-02
Payer: MEDICARE

## 2025-07-02 DIAGNOSIS — H40.1231: ICD-10-CM

## 2025-07-02 DIAGNOSIS — H16.223: ICD-10-CM

## 2025-07-02 DIAGNOSIS — H52.7: ICD-10-CM

## 2025-07-02 DIAGNOSIS — H10.45: ICD-10-CM

## 2025-07-02 PROCEDURE — 92015 DETERMINE REFRACTIVE STATE: CPT | Performed by: STUDENT IN AN ORGANIZED HEALTH CARE EDUCATION/TRAINING PROGRAM

## 2025-07-02 PROCEDURE — 99213 OFFICE O/P EST LOW 20 MIN: CPT | Performed by: STUDENT IN AN ORGANIZED HEALTH CARE EDUCATION/TRAINING PROGRAM

## 2025-07-02 ASSESSMENT — CONFRONTATIONAL VISUAL FIELD TEST (CVF)
OD_FINDINGS: FULL
OS_FINDINGS: FULL

## 2025-07-02 ASSESSMENT — PACHYMETRY
OD_CT_UM: 495
OD_CT_CORRECTION: 4
OS_CT_CORRECTION: 5
OS_CT_UM: 472

## 2025-07-02 ASSESSMENT — REFRACTION_CURRENTRX
OS_CYLINDER: -1.25
OS_AXIS: 078
OD_OVR_VA: 20/
OS_CYLINDER: -1.25
OS_SPHERE: +0.50
OD_AXIS: 110
OS_ADD: +2.00
OS_SPHERE: +0.50
OS_OVR_VA: 20/
OS_AXIS: 082
OS_OVR_VA: 20/
OD_VPRISM_DIRECTION: PROGS
OS_VPRISM_DIRECTION: PROGS
OD_AXIS: 108
OD_ADD: +2.00
OD_SPHERE: PLANO
OD_ADD: +2.50
OD_SPHERE: PLANO
OD_CYLINDER: -0.75
OD_CYLINDER: -0.75
OD_OVR_VA: 20/
OS_ADD: +2.50
OS_VPRISM_DIRECTION: PROGS
OD_VPRISM_DIRECTION: PROGS

## 2025-07-02 ASSESSMENT — REFRACTION_MANIFEST
OS_VA1: 20/25
OD_SPHERE: +0.25
OS_AXIS: 075
OD_VA1: 20/25+1
OD_AXIS: 105
OD_AXIS: 088
OD_VA1: 20/25+
OS_CYLINDER: -1.25
OS_CYLINDER: -1.75
OD_ADD: +2.50
OS_ADD: +2.50
OS_VA1: 20/20
OD_ADD: +2.50
OS_AXIS: 070
OS_SPHERE: +0.75
OS_SPHERE: +0.75
OD_CYLINDER: -1.00
OS_ADD: +2.50
OD_SPHERE: PLANO
OD_CYLINDER: -0.75

## 2025-07-02 ASSESSMENT — VISUAL ACUITY
OS_BCVA: 20/50-2
OD_BCVA: 20/40-2

## 2025-07-02 ASSESSMENT — TONOMETRY
OS_IOP_MMHG: 16
OD_IOP_MMHG: 18

## 2025-07-02 ASSESSMENT — REFRACTION_AUTOREFRACTION
OS_SPHERE: +0.75
OD_SPHERE: 0.00
OD_AXIS: 088
OS_AXIS: 070
OD_CYLINDER: -1.00
OS_CYLINDER: -1.75

## 2025-07-02 ASSESSMENT — KERATOMETRY
OD_K2POWER_DIOPTERS: 44.00
OD_AXISANGLE_DEGREES: 177
OS_AXISANGLE_DEGREES: 141
OS_K1POWER_DIOPTERS: 44.25
OD_K1POWER_DIOPTERS: 43.75
OS_K2POWER_DIOPTERS: 44.75

## 2025-07-02 ASSESSMENT — SUPERFICIAL PUNCTATE KERATITIS (SPK)
OD_SPK: 3+
OS_SPK: 2+